# Patient Record
Sex: FEMALE | Race: WHITE | NOT HISPANIC OR LATINO | Employment: FULL TIME | URBAN - METROPOLITAN AREA
[De-identification: names, ages, dates, MRNs, and addresses within clinical notes are randomized per-mention and may not be internally consistent; named-entity substitution may affect disease eponyms.]

---

## 2017-02-10 ENCOUNTER — ALLSCRIPTS OFFICE VISIT (OUTPATIENT)
Dept: OTHER | Facility: OTHER | Age: 37
End: 2017-02-10

## 2018-01-14 VITALS
WEIGHT: 182 LBS | DIASTOLIC BLOOD PRESSURE: 72 MMHG | SYSTOLIC BLOOD PRESSURE: 124 MMHG | HEIGHT: 65 IN | BODY MASS INDEX: 30.32 KG/M2

## 2018-02-22 DIAGNOSIS — Z30.40 ENCOUNTER FOR SURVEILLANCE OF CONTRACEPTIVES, UNSPECIFIED CONTRACEPTIVE: Primary | ICD-10-CM

## 2018-02-22 RX ORDER — NORGESTIMATE AND ETHINYL ESTRADIOL
KIT
Qty: 84 TABLET | Refills: 2 | Status: SHIPPED | OUTPATIENT
Start: 2018-02-22 | End: 2018-04-03 | Stop reason: ALTCHOICE

## 2018-04-03 ENCOUNTER — ANNUAL EXAM (OUTPATIENT)
Dept: OBGYN CLINIC | Facility: CLINIC | Age: 38
End: 2018-04-03
Payer: COMMERCIAL

## 2018-04-03 VITALS
SYSTOLIC BLOOD PRESSURE: 132 MMHG | DIASTOLIC BLOOD PRESSURE: 60 MMHG | BODY MASS INDEX: 31.02 KG/M2 | WEIGHT: 186.2 LBS | HEIGHT: 65 IN

## 2018-04-03 DIAGNOSIS — Z12.31 ENCOUNTER FOR SCREENING MAMMOGRAM FOR MALIGNANT NEOPLASM OF BREAST: ICD-10-CM

## 2018-04-03 DIAGNOSIS — Z01.419 ENCOUNTER FOR GYNECOLOGICAL EXAMINATION WITHOUT ABNORMAL FINDING: Primary | ICD-10-CM

## 2018-04-03 PROCEDURE — 99395 PREV VISIT EST AGE 18-39: CPT | Performed by: OBSTETRICS & GYNECOLOGY

## 2018-04-03 RX ORDER — VENLAFAXINE 37.5 MG/1
TABLET ORAL
COMMUNITY
Start: 2017-08-29

## 2018-04-03 RX ORDER — ALPRAZOLAM 0.25 MG/1
0.25 TABLET ORAL DAILY
Refills: 3 | COMMUNITY
Start: 2018-03-07 | End: 2020-08-10

## 2018-04-03 RX ORDER — NITROFURANTOIN 25; 75 MG/1; MG/1
CAPSULE ORAL
Refills: 0 | COMMUNITY
Start: 2017-12-28 | End: 2020-08-10

## 2018-04-03 NOTE — PROGRESS NOTES
Assessment/Plan:    No problem-specific Assessment & Plan notes found for this encounter  Diagnoses and all orders for this visit:    Encounter for gynecological examination without abnormal finding    Encounter for screening mammogram for malignant neoplasm of breast  -     Mammo screening bilateral w cad; Future    Other orders  -     ALPRAZolam (XANAX) 0 25 mg tablet; Take 0 25 mg by mouth daily  -     MULTIPLE VITAMINS-CALCIUM PO; Take by mouth  -     nitrofurantoin (MACROBID) 100 mg capsule; TAKE 1 CAPSULE (100 MG TOTAL) BY MOUTH 2 (TWO) TIMES A DAY FOR 5 DAYS  -     venlafaxine (EFFEXOR) 37 5 mg tablet; TAKE ONE TABLET BY MOUTH DAILY        Annual examination was completed  Mammogram was ordered  Examination failed to reveal any urethral or bladder related issues  Patient will continue to monitor and call if there is any significant change otherwise to return to the office in 1 year  Subjective:      Patient ID: Sharee Trevino is a 43 y o  female  Patient returns for annual gyn visit  She has no new medical surgical issues  She did have her urinary irritation evaluated by Urogynecology in this past year and there was no significant functional or anatomic etiology delineated  She still has occasional bothersome irritation but nothing that is persistent  Menses have been normal in timing in flow   did complete follow-up for vasectomy  Gynecologic Exam         The following portions of the patient's history were reviewed and updated as appropriate:   She  has a past medical history of Anxiety and Blood type, Rh negative  She   Patient Active Problem List    Diagnosis Date Noted    Encounter for screening mammogram for malignant neoplasm of breast 2018    Encounter for gynecological examination without abnormal finding 2018     She  has a past surgical history that includes  section; Dilation and curettage, diagnostic / therapeutic;  Tooth extraction; and Tonsillectomy  Her family history includes Breast cancer in her paternal grandmother  She  reports that she has quit smoking  She has never used smokeless tobacco  She reports that she drinks alcohol  Her drug history is not on file  Current Outpatient Prescriptions   Medication Sig Dispense Refill    venlafaxine (EFFEXOR) 37 5 mg tablet TAKE ONE TABLET BY MOUTH DAILY      ALPRAZolam (XANAX) 0 25 mg tablet Take 0 25 mg by mouth daily  3    MULTIPLE VITAMINS-CALCIUM PO Take by mouth      nitrofurantoin (MACROBID) 100 mg capsule TAKE 1 CAPSULE (100 MG TOTAL) BY MOUTH 2 (TWO) TIMES A DAY FOR 5 DAYS   0     No current facility-administered medications for this visit  Current Outpatient Prescriptions on File Prior to Visit   Medication Sig    [DISCONTINUED] PREVIFEM 0 25-35 MG-MCG per tablet TAKE AS DIRECTED     No current facility-administered medications on file prior to visit  She has No Known Allergies       Review of Systems   All other systems reviewed and are negative  Objective:      /60 (BP Location: Left arm, Patient Position: Sitting, Cuff Size: Standard)   Ht 5' 5" (1 651 m)   Wt 84 5 kg (186 lb 3 2 oz)   LMP 03/03/2018   BMI 30 99 kg/m²          Physical Exam   Constitutional: She is oriented to person, place, and time  She appears well-developed and well-nourished  HENT:   Head: Normocephalic and atraumatic  Eyes: EOM are normal  Pupils are equal, round, and reactive to light  Neck: Normal range of motion  Neck supple  Cardiovascular: Normal rate and regular rhythm  Pulmonary/Chest: Effort normal and breath sounds normal    Abdominal: Soft  Bowel sounds are normal  She exhibits no mass  There is no tenderness  Gravid, nontender   Genitourinary:   Genitourinary Comments: Ext gen nl female w/o lesions  Vagina healthy w/o lesions or discharge  Cervix closed, no lesions or d/c  Uterus  Gravid, NT  Adnexa NT, no mass   Musculoskeletal: Normal range of motion  Neurological: She is alert and oriented to person, place, and time  She has normal reflexes  Skin: Skin is warm and dry  Psychiatric: She has a normal mood and affect   Her behavior is normal  Thought content normal

## 2019-07-16 ENCOUNTER — ANNUAL EXAM (OUTPATIENT)
Dept: OBGYN CLINIC | Facility: CLINIC | Age: 39
End: 2019-07-16
Payer: COMMERCIAL

## 2019-07-16 VITALS
WEIGHT: 198.8 LBS | DIASTOLIC BLOOD PRESSURE: 68 MMHG | SYSTOLIC BLOOD PRESSURE: 112 MMHG | BODY MASS INDEX: 33.94 KG/M2 | HEIGHT: 64 IN

## 2019-07-16 DIAGNOSIS — Z11.51 SCREENING FOR HUMAN PAPILLOMAVIRUS (HPV): ICD-10-CM

## 2019-07-16 DIAGNOSIS — Z12.4 CERVICAL CANCER SCREENING: ICD-10-CM

## 2019-07-16 DIAGNOSIS — Z12.39 SCREENING FOR MALIGNANT NEOPLASM OF BREAST: ICD-10-CM

## 2019-07-16 DIAGNOSIS — Z01.419 ENCOUNTER FOR GYNECOLOGICAL EXAMINATION (GENERAL) (ROUTINE) WITHOUT ABNORMAL FINDINGS: Primary | ICD-10-CM

## 2019-07-16 PROCEDURE — 99396 PREV VISIT EST AGE 40-64: CPT | Performed by: OBSTETRICS & GYNECOLOGY

## 2019-07-16 NOTE — PROGRESS NOTES
Assessment/Plan:    No problem-specific Assessment & Plan notes found for this encounter  Diagnoses and all orders for this visit:    Cervical cancer screening  -     PapIG, HPV, rfx 16/18    Encounter for gynecological examination (general) (routine) without abnormal findings  -     PapIG, HPV, rfx 16/18    Screening for malignant neoplasm of breast  -     Mammo screening bilateral w cad; Future    Screening for human papillomavirus (HPV)  -     PapIG, HPV, rfx 16/18        Annual examination was completed  Pap with HPV Co testing was obtained  Mammogram was ordered  Patient to return to the office in 1 year or as necessary  Subjective:      Patient ID: An Jackman is a 35 y o  female  Patient returns for annual gyn visit  She has no new medical surgical issues since last being seen  Menses have been well timed and normal in flow  Patient has some minimal physical activity  The following portions of the patient's history were reviewed and updated as appropriate:   She  has a past medical history of Anxiety and Blood type, Rh negative  She   Patient Active Problem List    Diagnosis Date Noted    Encounter for gynecological examination (general) (routine) without abnormal findings 2019    Screening for malignant neoplasm of breast 2019    Cervical cancer screening 2019    Screening for human papillomavirus (HPV) 2019    Encounter for screening mammogram for malignant neoplasm of breast 2018    Encounter for gynecological examination without abnormal finding 2018     She  has a past surgical history that includes  section; Dilation and curettage, diagnostic / therapeutic; Tooth extraction; and Tonsillectomy  Her family history includes Breast cancer in her paternal grandmother  She  reports that she has quit smoking  She has never used smokeless tobacco  She reports that she drinks alcohol   Her drug history is not on file   Current Outpatient Medications   Medication Sig Dispense Refill    ALPRAZolam (XANAX) 0 25 mg tablet Take 0 25 mg by mouth daily  3    MULTIPLE VITAMINS-CALCIUM PO Take by mouth      venlafaxine (EFFEXOR) 37 5 mg tablet TAKE ONE TABLET BY MOUTH DAILY      nitrofurantoin (MACROBID) 100 mg capsule TAKE 1 CAPSULE (100 MG TOTAL) BY MOUTH 2 (TWO) TIMES A DAY FOR 5 DAYS   0     No current facility-administered medications for this visit  Current Outpatient Medications on File Prior to Visit   Medication Sig    ALPRAZolam (XANAX) 0 25 mg tablet Take 0 25 mg by mouth daily    MULTIPLE VITAMINS-CALCIUM PO Take by mouth    venlafaxine (EFFEXOR) 37 5 mg tablet TAKE ONE TABLET BY MOUTH DAILY    nitrofurantoin (MACROBID) 100 mg capsule TAKE 1 CAPSULE (100 MG TOTAL) BY MOUTH 2 (TWO) TIMES A DAY FOR 5 DAYS  No current facility-administered medications on file prior to visit  She has No Known Allergies       Review of Systems   All other systems reviewed and are negative  Objective:      /68 (BP Location: Left arm, Patient Position: Sitting, Cuff Size: Large)   Ht 5' 4" (1 626 m)   Wt 90 2 kg (198 lb 12 8 oz)   LMP 06/22/2019   BMI 34 12 kg/m²          Physical Exam   Constitutional: She is oriented to person, place, and time  She appears well-developed and well-nourished  HENT:   Head: Normocephalic and atraumatic  Nose: Nose normal    Eyes: Pupils are equal, round, and reactive to light  EOM are normal    Neck: Normal range of motion  Neck supple  No thyromegaly present  Pulmonary/Chest: Effort normal  No respiratory distress  No breast tenderness, discharge or bleeding  Breasts no masses, tenderness, skin changes   Abdominal: Soft  She exhibits no distension and no mass  There is no tenderness  Hernia confirmed negative in the right inguinal area and confirmed negative in the left inguinal area     Genitourinary: Vagina normal and uterus normal  No breast tenderness, discharge or bleeding  Pelvic exam was performed with patient supine  No labial fusion  There is no rash, tenderness, lesion or injury on the right labia  There is no rash, tenderness, lesion or injury on the left labia  Cervix exhibits no motion tenderness, no discharge and no friability  Genitourinary Comments: Ext genitalia nl female w/o lesions  Vag healthy without lesions or discharge  Cervix healthy w/o lesions or discharge  uterus nl size, NT, no mass  Adnexa NT, no mass   Musculoskeletal: Normal range of motion  She exhibits no edema  Lymphadenopathy:        Right: No inguinal adenopathy present  Left: No inguinal adenopathy present  Neurological: She is alert and oriented to person, place, and time  She has normal reflexes  Skin: Skin is warm and dry  No rash noted  Psychiatric: She has a normal mood and affect  Her behavior is normal  Thought content normal    Nursing note and vitals reviewed

## 2019-07-18 LAB
CYTOLOGIST CVX/VAG CYTO: NORMAL
DX ICD CODE: NORMAL
HPV I/H RISK 1 DNA CVX QL PROBE+SIG AMP: NEGATIVE
OTHER STN SPEC: NORMAL
PATH REPORT.FINAL DX SPEC: NORMAL
SL AMB NOTE:: NORMAL
SL AMB SPECIMEN ADEQUACY: NORMAL
SL AMB TEST METHODOLOGY: NORMAL

## 2019-07-30 ENCOUNTER — TELEPHONE (OUTPATIENT)
Dept: OBGYN CLINIC | Facility: CLINIC | Age: 39
End: 2019-07-30

## 2020-08-10 ENCOUNTER — ANNUAL EXAM (OUTPATIENT)
Dept: OBGYN CLINIC | Facility: CLINIC | Age: 40
End: 2020-08-10
Payer: COMMERCIAL

## 2020-08-10 VITALS
SYSTOLIC BLOOD PRESSURE: 112 MMHG | BODY MASS INDEX: 33.89 KG/M2 | WEIGHT: 203.4 LBS | TEMPERATURE: 97.7 F | HEIGHT: 65 IN | DIASTOLIC BLOOD PRESSURE: 68 MMHG

## 2020-08-10 DIAGNOSIS — Z01.419 ENCOUNTER FOR GYNECOLOGICAL EXAMINATION (GENERAL) (ROUTINE) WITHOUT ABNORMAL FINDINGS: Primary | ICD-10-CM

## 2020-08-10 DIAGNOSIS — Z12.31 ENCOUNTER FOR SCREENING MAMMOGRAM FOR MALIGNANT NEOPLASM OF BREAST: ICD-10-CM

## 2020-08-10 PROBLEM — Z11.51 SCREENING FOR HUMAN PAPILLOMAVIRUS (HPV): Status: RESOLVED | Noted: 2019-07-16 | Resolved: 2020-08-10

## 2020-08-10 PROBLEM — Z12.4 CERVICAL CANCER SCREENING: Status: RESOLVED | Noted: 2019-07-16 | Resolved: 2020-08-10

## 2020-08-10 PROBLEM — Z12.39 SCREENING FOR MALIGNANT NEOPLASM OF BREAST: Status: RESOLVED | Noted: 2019-07-16 | Resolved: 2020-08-10

## 2020-08-10 PROCEDURE — 99396 PREV VISIT EST AGE 40-64: CPT | Performed by: OBSTETRICS & GYNECOLOGY

## 2020-08-10 RX ORDER — BUSPIRONE HYDROCHLORIDE 5 MG/1
TABLET ORAL
COMMUNITY
Start: 2020-04-03

## 2020-08-10 NOTE — PROGRESS NOTES
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  Last Pap 7/16/19 - Normal Cytology, Neg HPV  Last Mammo - overdue, [facility]    We reviewed ASCCP guidelines for Pap testing today       Family hx of breast cancer:  P  Grandmother  Family hx of ovarian cancer:  No  Family hx of colon cancer:  No      Current Outpatient Medications:     MULTIPLE VITAMINS-CALCIUM PO, Take by mouth, Disp: , Rfl:     venlafaxine (EFFEXOR) 37 5 mg tablet, TAKE ONE TABLET BY MOUTH DAILY, Disp: , Rfl:   Social History     Socioeconomic History    Marital status: /Civil Union     Spouse name: Not on file    Number of children: Not on file    Years of education: Not on file    Highest education level: Not on file   Occupational History    Not on file   Social Needs    Financial resource strain: Not on file    Food insecurity     Worry: Not on file     Inability: Not on file   [facility] Medical needs     Medical: Not on file     Non-medical: Not on file   Tobacco Use    Smoking status: Former Smoker    Smokeless tobacco: Never Used   Substance and Sexual Activity    Alcohol use: Yes     Comment: social    Drug use: Never    Sexual activity: Yes     Partners: Male     Birth control/protection: Male Sterilization     Comment: vasc   Lifestyle    Physical activity     Days per week: Not on file     Minutes per session: Not on file    Stress: Not on file   Relationships    Social connections     Talks on phone: Not on file     Gets together: Not on file     Attends [employer] service: Not on file     Active member of club or organization: Not on file     Attends meetings of clubs or organizations: Not on file     Relationship status: Not on file    Intimate partner violence     Fear of current or ex partner: Not on file     Emotionally abused: Not on file     Physically abused: Not on file     Forced sexual activity: Not on file   Other Topics Concern    Not on file   Social History Narrative    Daily Coffee: 1 cup/day     Family History   Problem Relation Age of Onset    Breast cancer Paternal Grandmother       Past Medical History:   Diagnosis Date    Anxiety     Blood type, Rh negative         Review of Systems   Respiratory: Negative  Cardiovascular: Negative  Gastrointestinal: Negative for constipation and diarrhea  Genitourinary: Negative for difficulty urinating, pelvic pain, vaginal bleeding, vaginal discharge, itching or odor  O:  Blood pressure 112/68, temperature 97 7 °F (36 5 °C), height 5' 4 5" (1 638 m), weight 92 3 kg (203 lb 6 4 oz)  Patient appears well and is not in distress  Breasts are symmetrical without mass, tenderness, nipple discharge, skin changes or adenopathy  Abdomen is soft and nontender without masses  External genitals are normal without lesions or rashes  Urethral meatus and urethra are normal  Bladder is normal to palpation  Vagina is normal without discharge or bleeding  Cervix is normal without discharge or lesion  Uterus is normal, mobile, nontender without palpable mass  Adnexa are normal, nontender, without palpable mass  A:  Yearly exam      P:   Pap up to date   Mammo slip provided    RTO one year for yearly exam or sooner as needed

## 2020-09-21 DIAGNOSIS — Z12.31 ENCOUNTER FOR SCREENING MAMMOGRAM FOR MALIGNANT NEOPLASM OF BREAST: ICD-10-CM

## 2021-03-26 DIAGNOSIS — Z23 ENCOUNTER FOR IMMUNIZATION: ICD-10-CM

## 2021-03-29 ENCOUNTER — TELEPHONE (OUTPATIENT)
Dept: OBGYN CLINIC | Facility: CLINIC | Age: 41
End: 2021-03-29

## 2021-03-29 NOTE — TELEPHONE ENCOUNTER
Shaving her vaginal area and cut herself, she was asking what to do, I advised to cb if still bleeding

## 2021-08-16 ENCOUNTER — ANNUAL EXAM (OUTPATIENT)
Dept: OBGYN CLINIC | Facility: CLINIC | Age: 41
End: 2021-08-16
Payer: COMMERCIAL

## 2021-08-16 VITALS
BODY MASS INDEX: 35.96 KG/M2 | SYSTOLIC BLOOD PRESSURE: 120 MMHG | WEIGHT: 210.6 LBS | DIASTOLIC BLOOD PRESSURE: 70 MMHG | HEIGHT: 64 IN

## 2021-08-16 DIAGNOSIS — Z01.419 ENCOUNTER FOR GYNECOLOGICAL EXAMINATION (GENERAL) (ROUTINE) WITHOUT ABNORMAL FINDINGS: Primary | ICD-10-CM

## 2021-08-16 DIAGNOSIS — Z12.31 ENCOUNTER FOR SCREENING MAMMOGRAM FOR MALIGNANT NEOPLASM OF BREAST: ICD-10-CM

## 2021-08-16 PROBLEM — M62.89 PELVIC FLOOR DYSFUNCTION: Status: ACTIVE | Noted: 2017-11-16

## 2021-08-16 PROBLEM — E66.9 CLASS 2 OBESITY IN ADULT: Status: ACTIVE | Noted: 2021-08-16

## 2021-08-16 PROBLEM — F41.9 ANXIETY: Status: ACTIVE | Noted: 2021-08-16

## 2021-08-16 PROBLEM — E66.812 CLASS 2 OBESITY IN ADULT: Status: ACTIVE | Noted: 2021-08-16

## 2021-08-16 PROCEDURE — 99396 PREV VISIT EST AGE 40-64: CPT | Performed by: OBSTETRICS & GYNECOLOGY

## 2021-08-16 NOTE — PROGRESS NOTES
Rodney Rocha  1980      CC:  Yearly exam    S:  42 y o  female here for yearly exam  Her cycles are regular, not heavy or crampy  LMP 7/22/21  Sexual activity: She is sexually active without pain, bleeding or dryness   & monogamous  Kids are now 6 and 15  Work has been good  Contraception: She uses vasectomy  for contraception  Gardasil:  She has not had the Gardasil series  VIS given, she is aware she can start at any time, eligible until age 39  Last Pap 7/2019 - Normal Cytology, Neg HPV  Last Mammo 9/2020 - [facility]    We reviewed ASCCP guidelines for Pap testing today  Family hx of breast cancer: PGM  Family hx of ovarian cancer: no  Family hx of colon cancer: no      Current Outpatient Medications:     busPIRone (BUSPAR) 5 mg tablet, , Disp: , Rfl:     MULTIPLE VITAMINS-CALCIUM PO, Take by mouth, Disp: , Rfl:     venlafaxine (EFFEXOR) 37 5 mg tablet, TAKE ONE TABLET BY MOUTH DAILY, Disp: , Rfl:     Family History   Problem Relation Age of Onset    Breast cancer Paternal Grandmother       Past Medical History:   Diagnosis Date    Anxiety     Blood type, Rh negative         Review of Systems   Respiratory: Negative  Cardiovascular: Negative  Gastrointestinal: Negative for constipation and diarrhea  Genitourinary: Negative for difficulty urinating, pelvic pain, vaginal bleeding, vaginal discharge, itching or odor  O:  Blood pressure 120/70, height 5' 4" (1 626 m), weight 95 5 kg (210 lb 9 6 oz), last menstrual period 07/22/2021  Patient appears well and is not in distress  Breasts are symmetrical without mass, tenderness, nipple discharge, skin changes or adenopathy  Abdomen is soft and nontender without masses  External genitals are normal without lesions or rashes  Urethral meatus and urethra are normal  Bladder is normal to palpation  Vagina is normal without discharge or bleeding  Cervix is normal without discharge or lesion     Uterus is normal, mobile, nontender without palpable mass  Adnexa are normal, nontender, without palpable mass  A:  Yearly exam      P:   Pap up to date   Mammo slip provided    VIS on HPV vaccine given  RTO one year for yearly exam or sooner as needed

## 2022-08-30 ENCOUNTER — ANNUAL EXAM (OUTPATIENT)
Dept: OBGYN CLINIC | Facility: CLINIC | Age: 42
End: 2022-08-30
Payer: COMMERCIAL

## 2022-08-30 VITALS
DIASTOLIC BLOOD PRESSURE: 64 MMHG | WEIGHT: 205.4 LBS | HEIGHT: 64 IN | SYSTOLIC BLOOD PRESSURE: 100 MMHG | BODY MASS INDEX: 35.07 KG/M2

## 2022-08-30 DIAGNOSIS — Z12.31 ENCOUNTER FOR SCREENING MAMMOGRAM FOR MALIGNANT NEOPLASM OF BREAST: ICD-10-CM

## 2022-08-30 DIAGNOSIS — Z01.419 ENCNTR FOR GYN EXAM (GENERAL) (ROUTINE) W/O ABN FINDINGS: Primary | ICD-10-CM

## 2022-08-30 PROCEDURE — 99396 PREV VISIT EST AGE 40-64: CPT | Performed by: OBSTETRICS & GYNECOLOGY

## 2022-08-30 NOTE — PROGRESS NOTES
Naun Rocha  1980      CC:  Yearly exam    S:  36 y o  female here for yearly exam  Her cycles are regular, not heavy or crampy  Daughter is 12 and will be coming to see me soon  She denies vaginal discharge, itching, pelvic pain  She has no urinary concerns other than some increase in frequency, does not have incontinence  No bowel concerns  No breast concerns  Sexual activity: She is sexually active without pain, bleeding or dryness  She is  and monogamous  She is not interested in STD screening today  Contraception: She uses vasectomy for contraception  Last Pap: 7/16/2019 - NILM, Neg HPV  Last Mammo: 9/24/21 - [facility]    We reviewed ASCCP guidelines for Pap testing today  Family hx of breast cancer: PGM  Family hx of ovarian cancer: no  Family hx of colon cancer: no      Current Outpatient Medications:     busPIRone (BUSPAR) 5 mg tablet, , Disp: , Rfl:     MULTIPLE VITAMINS-CALCIUM PO, Take by mouth, Disp: , Rfl:     venlafaxine (EFFEXOR) 37 5 mg tablet, TAKE ONE TABLET BY MOUTH DAILY, Disp: , Rfl:      Patient Active Problem List   Diagnosis    Encounter for screening mammogram for malignant neoplasm of breast    Encounter for gynecological examination (general) (routine) without abnormal findings    Pelvic floor dysfunction    Anxiety    Class 2 obesity in adult     Past Medical History:   Diagnosis Date    Anxiety     Blood type, Rh negative      Family History   Problem Relation Age of Onset    Breast cancer Paternal Grandmother           Review of Systems   Respiratory: Negative  Cardiovascular: Negative  Gastrointestinal: Negative for constipation and diarrhea  O:  Blood pressure 100/64, height 5' 4 17" (1 63 m), weight 93 2 kg (205 lb 6 4 oz), last menstrual period 08/14/2022  Patient appears well and is not in distress  Breasts are symmetrical without mass, tenderness, nipple discharge, skin changes or adenopathy     Abdomen is soft and nontender without masses  External genitals are normal without lesions or rashes  Urethral meatus and urethra are normal  Bladder is normal to palpation  Vagina is normal without discharge or bleeding  Cervix is normal without discharge or lesion  Uterus is normal, mobile, nontender without palpable mass  Adnexa are normal, nontender, without palpable mass  A:  Yearly exam      P:   Pap & HPV up to date   Mammo ordered   Colon Cancer Screening age 38   RTO one year for yearly exam or sooner as needed

## 2022-10-10 DIAGNOSIS — Z12.31 ENCOUNTER FOR SCREENING MAMMOGRAM FOR MALIGNANT NEOPLASM OF BREAST: ICD-10-CM

## 2023-11-17 ENCOUNTER — ANNUAL EXAM (OUTPATIENT)
Age: 43
End: 2023-11-17
Payer: COMMERCIAL

## 2023-11-17 VITALS
SYSTOLIC BLOOD PRESSURE: 118 MMHG | DIASTOLIC BLOOD PRESSURE: 60 MMHG | BODY MASS INDEX: 35.12 KG/M2 | WEIGHT: 210.8 LBS | HEIGHT: 65 IN

## 2023-11-17 DIAGNOSIS — Z12.31 ENCOUNTER FOR SCREENING MAMMOGRAM FOR MALIGNANT NEOPLASM OF BREAST: ICD-10-CM

## 2023-11-17 DIAGNOSIS — Z11.51 SCREENING FOR HUMAN PAPILLOMAVIRUS (HPV): ICD-10-CM

## 2023-11-17 DIAGNOSIS — Z01.419 ENCNTR FOR GYN EXAM (GENERAL) (ROUTINE) W/O ABN FINDINGS: Primary | ICD-10-CM

## 2023-11-17 PROCEDURE — G0476 HPV COMBO ASSAY CA SCREEN: HCPCS | Performed by: OBSTETRICS & GYNECOLOGY

## 2023-11-17 PROCEDURE — G0145 SCR C/V CYTO,THINLAYER,RESCR: HCPCS | Performed by: OBSTETRICS & GYNECOLOGY

## 2023-11-17 PROCEDURE — S0612 ANNUAL GYNECOLOGICAL EXAMINA: HCPCS | Performed by: OBSTETRICS & GYNECOLOGY

## 2023-11-17 NOTE — PROGRESS NOTES
Lyric Rocha  1980      CC:  Yearly exam    S:  39 y.o. female here for yearly exam. Her cycles are regular, not heavy or crampy. Seeing PT/urogyn at [facility] for urinary hesitancy. She denies vaginal discharge, itching, pelvic pain. She has urinary concerns - continuing to work on this. No bowel concerns. No breast concerns. Sexual activity: She is sexually active without pain, bleeding or dryness. She is not interested in STD screening today. Contraception: She uses vasectomy for contraception. Last Pap: 7/16/19 - NILM, Neg HPV  Last Mammo:  11/3/2023 - [facility]    We reviewed [facility] guidelines for Pap testing today. Family hx of breast cancer: PGM  Family hx of ovarian cancer: no  Family hx of colon cancer: no      Current Outpatient Medications:     busPIRone (BUSPAR) 5 mg tablet, , Disp: , Rfl:     MULTIPLE VITAMINS-CALCIUM PO, Take by mouth, Disp: , Rfl:     venlafaxine (EFFEXOR) 37.5 mg tablet, TAKE ONE TABLET BY MOUTH DAILY, Disp: , Rfl:   Patient Active Problem List   Diagnosis    Encounter for screening mammogram for malignant neoplasm of breast    Encounter for gynecological examination (general) (routine) without abnormal findings    Pelvic floor dysfunction    Anxiety    Class 2 obesity in adult     Past Medical History:   Diagnosis Date    Anxiety     Blood type, Rh negative      Family History   Problem Relation Age of Onset    Breast cancer Paternal Grandmother       Review of Systems   Respiratory: Negative. Cardiovascular: Negative. Gastrointestinal: Negative for constipation and diarrhea. O:  Blood pressure 118/60, height 5' 4.5" (1.638 m), weight 95.6 kg (210 lb 12.8 oz), last menstrual period 11/10/2023. Patient appears well and is not in distress  Breasts are symmetrical without mass, tenderness, nipple discharge, skin changes or adenopathy. Abdomen is soft and nontender without masses.    External genitals are normal without lesions or rashes. Urethral meatus and urethra are normal  Bladder is normal to palpation  Vagina is normal without discharge or bleeding. Cervix is normal without discharge or lesion. Uterus is normal, mobile, nontender without palpable mass. Adnexa are normal, nontender, without palpable mass. A:  Yearly exam.     P:   Pap & HPV today   Mammo ordered, up to date   Colon Cancer Screening age 38   RTO one year for yearly exam or sooner as needed.

## 2023-11-20 LAB
HPV HR 12 DNA CVX QL NAA+PROBE: NEGATIVE
HPV16 DNA CVX QL NAA+PROBE: NEGATIVE
HPV18 DNA CVX QL NAA+PROBE: NEGATIVE

## 2023-11-28 LAB
LAB AP GYN PRIMARY INTERPRETATION: NORMAL
LAB AP LMP: NORMAL
Lab: NORMAL

## 2024-02-21 PROBLEM — Z01.419 ENCOUNTER FOR GYNECOLOGICAL EXAMINATION (GENERAL) (ROUTINE) WITHOUT ABNORMAL FINDINGS: Status: RESOLVED | Noted: 2019-07-16 | Resolved: 2024-02-21

## 2024-11-20 ENCOUNTER — TELEPHONE (OUTPATIENT)
Age: 44
End: 2024-11-20

## 2025-01-24 ENCOUNTER — EVALUATION (OUTPATIENT)
Dept: PHYSICAL THERAPY | Facility: CLINIC | Age: 45
End: 2025-01-24
Payer: COMMERCIAL

## 2025-01-24 DIAGNOSIS — M62.89 PELVIC FLOOR DYSFUNCTION: Primary | ICD-10-CM

## 2025-01-24 PROCEDURE — 97530 THERAPEUTIC ACTIVITIES: CPT | Performed by: PHYSICAL THERAPIST

## 2025-01-24 PROCEDURE — 97162 PT EVAL MOD COMPLEX 30 MIN: CPT | Performed by: PHYSICAL THERAPIST

## 2025-01-24 NOTE — PROGRESS NOTES
PT Evaluation     Today's date: 2025  Patient name: Nga Rocha  : 1980  MRN: [id number]  Referring provider: Vinicius Franco PT  Dx:   Encounter Diagnosis     ICD-10-CM    1. Pelvic floor dysfunction  M62.89                      Assessment  Impairments: abnormal muscle tone, activity intolerance, lacks appropriate home exercise program, pain with function, poor posture , poor body mechanics, activity limitations and endurance  Symptom irritability: moderate  Understanding of Dx/Px/POC: excellent     Prognosis: excellent    Goals  (Up to 8 weeks)  1. Independent and safe with all PF HEP.  2. Independent and safe with self-postural correction and home use of a squatty potty to improve BM.  3. Independent and safe with self stretching tech to improve b/l AD's flexibility.  4. Independent and safe with pelvic wand and/or dilators use to improve comfort with intimacy.    Plan  Patient would benefit from: skilled physical therapy  Planned modality interventions: biofeedback    Planned therapy interventions: manual therapy, neuromuscular re-education, patient/caregiver education, postural training, stretching, therapeutic activities, therapeutic exercise, home exercise program, graded exercise, graded activity, flexibility, body mechanics training, breathing training, behavior modification, activity modification and abdominal trunk stabilization    Frequency: 1x week  Duration in weeks: 8  Treatment plan discussed with: patient        PT Pelvic Floor Subjective:   History of Present Illness:   Pt is 44 y/o female with Hx of increase urgency to urinate, possible incomplete bladder emptying with need to strain, strain with BM, discomfort with intimacy. Pt was seen by her urogynecologist (from [facility]) who recommended PF PT due to PFM tightness. Symptoms gradually getting worse. Onset: after , around .  Current functional limitations: (+) PFM tightness, (+) pain with intimacy: on insertion and deep  penetration, urine flow is very slow, (+) on/off constipation.        Recurrent probem    Quality of life: poor    Social Support:     Lives in:  Multiple-level home    Lives with:  Spouse and young children    Relationship status: /committed    Work status: employed full time (prolong sitting)    Life stress level: 5    Life stress severity: moderate    History of Depression: noPronouns: she/her  Hand dominance:  Right  Diet and Exercise:    Diet:balanced nutrition    Exercise type: no activity    Exercise frequency: never    Not exercising due to: not interested  OB/ gyn History    Gestational History:     Prior Pregnancy: Yes      Number of prior pregnancies: 4    Number of term pregnancies: 2    Delivery Type: vaginal delivery and  section      Number of vaginal deliveries: 1 (, 7.8 lbs)    Number of caesarian sections: 1 (, 7.8 lb)    Delivery Complications:  First delivery: vacuum assist, prolong delivery time    Second delivery: emergency     Menstrual History:    Date of last menstrual period: 2024    Menstrual irregularities regular menses    Painful periods:  No difficulty managing menstrual pain    Tolerates tampons: no by choice and no  Bladder Function:     Voiding Difficulties positive for: urgency, straining and incomplete emptying       Voiding Difficulties comments:     Voiding frequency: every 1-2 hours    Urinary leakage: no urine leakage    Nocturia (episodes per night): 0    Painful urination: No      Fluid Intake Type:  Water, coffee and alcohol    Intake (ounces): Water intake (oz): 16 oz x 4. Coffee intake (oz): 16 oz per day. Alcohol intake (oz): socially on/off.  Incontinence Management:     Pads/Diaper Use:  None  Bowel Function:     Voiding DIfficulties: unfinished feeling after defecating and constipation      Bowel Function comments:  Educated on postural correction with a squatty potty use and BM improvement.    Bowel frequency: daily and multiple  "times a day    [place] Stool Scale: type 4, type 5 and type 1    Stool softener use: no stool softeners    Enema use: no enema    Uses \"squatty potty\": no Squatty Potty  Sexual Function:     Sexually Active:  Sexually active    pain does not cause abstinenceSexual function: vaginal pain  Pain:     No pain reported by patient.  Diagnostic Tests:     Urodynamics: normal    Cystoscopy: normal  Treatments:     Previous treatment:  Physical therapy and medication  Patient Goals:     Patient goals for therapy:  Improved quality of life, fully empty bladder or bowels, improved bladder or bowel function, improved comfort, relaxation and decreased interpersonal problems      Objective     Postural Observations  Seated posture: fair  Standing posture: fair  Correction of posture: has no consistent effect      Active Range of Motion     Lumbar   Normal active range of motion    Passive Range of Motion     Lumbar   Normal passive range of motion    Strength/Myotome Testing     Lumbar   Left   Normal strength    Right   Normal strength      Abdominal Assessment:      Position: supine exam  Abdominal Assessment:  Abdominal findings with curl up: supine with knees flexed.    Diastatis   Diastasis recti present? no  Connective tissue integrity at linea alba: firm  no tenderness at linea alba  able to engage transverse abdominis     Skin inspection:   scars present.   Additional skin inspection details: Old  scar: fully healed, no restrictions, no hypersensitivity.      General Perineum Exam:   perineum intact.     Visual Inspection of Perineum:   Excursion of perineal body in cephalad direction with contraction of pelvic floor muscles (PFM): good  Excursion of perineal body in caudal direction with relaxation of pelvic floor muscles (PFM): good   Involuntary contraction with coughing: no  Involuntary relaxation with bearing down: no  [place] swab test: non-tender  Cough reflex: cough reflex  Sphincter Tone Resting: " normal  Sphincter Tone Squeeze: normal  Sensation: intact  Tenderness: unprovoked    Pelvic Organ Prolapse   Position: hook-lying  At rest: none  With bearing down: mild (>1cm from hymenal remnants)  Location: midline vaginal  Perineal body inspection: within normal limits        Pelvic Floor Muscle Exam:     Muscle Contraction: well isolated   Breathing pattern with contraction: holding breath   Pelvic floor muscle relaxation is complete.         PERFECT Score   Power right: 4+/5   Power left: 4+/5   Endurance (seconds to max): 5   Repetitions (before fatigue): 5   Fast flicks (in 10 seconds): 7      Rectal Pelvic Floor Muscle Exam    External anal sphincter: wink reflex intact    pelvic floor exam consent given by patient    Pelvic exam completed: vaginally                Precautions: not nay given      Manuals 1/24            PF MMT 4+/5 with increase tone, poor full relaxation of PFM            B/l AD's stretch             Internal/external vaginal stretching             Abdominal myofascial tech             PM edu/benefits             Neuro Re-Ed                                       T'dmill/ posture                          Rec bike/posture                                       Ther Ex             Supine abdominal breathing demo            Butterfly stretch demo            Child pose stretch demo                                                                             Ther Activity             HEP edu/review 5' given            Squatty potty edu/postural edu 2.5 min            Pelvic wand use 2.5 min                         Gait Training                                       Modalities

## 2025-02-14 ENCOUNTER — APPOINTMENT (OUTPATIENT)
Dept: PHYSICAL THERAPY | Facility: CLINIC | Age: 45
End: 2025-02-14
Payer: COMMERCIAL

## 2025-02-20 ENCOUNTER — OFFICE VISIT (OUTPATIENT)
Dept: PHYSICAL THERAPY | Facility: CLINIC | Age: 45
End: 2025-02-20
Payer: COMMERCIAL

## 2025-02-20 DIAGNOSIS — M62.89 PELVIC FLOOR DYSFUNCTION: Primary | ICD-10-CM

## 2025-02-20 PROCEDURE — 97530 THERAPEUTIC ACTIVITIES: CPT | Performed by: PHYSICAL THERAPIST

## 2025-02-20 PROCEDURE — 97140 MANUAL THERAPY 1/> REGIONS: CPT | Performed by: PHYSICAL THERAPIST

## 2025-02-20 PROCEDURE — 97110 THERAPEUTIC EXERCISES: CPT | Performed by: PHYSICAL THERAPIST

## 2025-02-20 NOTE — PROGRESS NOTES
"Daily Note     Today's date: 2025  Patient name: Nga Rocha  : 1980  MRN: [id number]  Referring provider: Vinicius Franco, PT  Dx:   Encounter Diagnosis     ICD-10-CM    1. Pelvic floor dysfunction  M62.89                      Subjective: No change in symptoms verbalized. (+) pelvic wand presented during tx.      Objective: See treatment diary below      Assessment: Tolerated treatment well. Patient demonstrated fatigue post treatment and would benefit from continued PT for further internal manual tx and use of a pelvic wand. Pt will focus on L LQ pelvic floor region with daily home use of a pelvic wand. (+) TP's in L obturator internus, [place] and Iliococcygeus mm noted/verbalized. Pt is very motivated and willing to participate in PT tx. No discomfort voiced upon leaving [facility] PT.      Plan: Continue per plan of care.  Progress treatment as tolerated.       Precautions: not nay given      Manuals            PF MMT 4+/5 with increase tone, poor full relaxation of PFM            B/l AD's stretch  SZ           Internal/external vaginal stretching  SZ           Abdominal myofascial tech  SZ           PM edu/benefits             Neuro Re-Ed                                       T'dmill/ posture                          Rec bike/posture                                       Ther Ex             Supine abdominal breathing demo reviewed           Butterfly stretch demo 3x30\"           Child pose stretch demo                                                                             Ther Activity             HEP edu/review 5' given 2.5'           Squatty potty edu/postural edu 2.5 min 2.5'           Pelvic wand use 2.5 min Direct observation/edu on use 10'                        Gait Training                                       Modalities                                            "

## 2025-02-28 ENCOUNTER — OFFICE VISIT (OUTPATIENT)
Dept: PHYSICAL THERAPY | Facility: CLINIC | Age: 45
End: 2025-02-28
Payer: COMMERCIAL

## 2025-02-28 DIAGNOSIS — M62.89 PELVIC FLOOR DYSFUNCTION: Primary | ICD-10-CM

## 2025-02-28 PROCEDURE — 97140 MANUAL THERAPY 1/> REGIONS: CPT | Performed by: PHYSICAL THERAPIST

## 2025-02-28 PROCEDURE — 97110 THERAPEUTIC EXERCISES: CPT | Performed by: PHYSICAL THERAPIST

## 2025-02-28 PROCEDURE — 97530 THERAPEUTIC ACTIVITIES: CPT | Performed by: PHYSICAL THERAPIST

## 2025-02-28 NOTE — PROGRESS NOTES
"Daily Note     Today's date: 2025  Patient name: Nga Rocha  : 1980  MRN: [id number]  Referring provider: Vinicius Franco, PT  Dx:   Encounter Diagnosis     ICD-10-CM    1. Pelvic floor dysfunction  M62.89                      Subjective: No new changes voiced. (+) use of a pelvic wand at home. (+) straining with voiding, repositioning in sitting while voiding needed, no pain at rest in PF      Objective: See treatment diary below      Assessment: Tolerated treatment well. Patient c/o increase discomfort with internal PF stretching. \"[place] like\" TP palpated in mid/left L PF region. Pt was advised to reach-out to her OBGYN to r/o any new changes in PF (poss US recommended). HEP was updated and reviewed. Overall good destiny to tx. No worsening of discomfort upon leaving [facility] PT verbalized.      Plan: Continue per plan of care.  Progress treatment as tolerated.       Precautions: not nay given      Manuals           PF MMT 4+/5 with increase tone, poor full relaxation of PFM  same          B/l AD's stretch  SZ           Internal/external vaginal stretching  SZ SZ          Abdominal myofascial tech  SZ           PM edu/benefits   SZ          Neuro Re-Ed                                       T'dmill/ posture                          Rec bike/posture                                       Ther Ex             Supine abdominal breathing demo reviewed review          Butterfly stretch demo 3x30\" review          Child pose stretch demo  review          Happy baby stretch w PF lengthening   5x5\" VC's/TC's          Supine PF lengthening   5x5\" VC's          Sitting PF elevator with lengthening   5x5\" VC's/TC's                                    Ther Activity             HEP edu/review 5' given 2.5' 8' update/given          Squatty potty edu/postural edu 2.5 min 2.5'           Pelvic wand use 2.5 min Direct observation/edu on use 10'                        Gait Training                                    "    Modalities

## 2025-03-05 ENCOUNTER — OFFICE VISIT (OUTPATIENT)
Age: 45
End: 2025-03-05
Payer: COMMERCIAL

## 2025-03-05 VITALS
SYSTOLIC BLOOD PRESSURE: 120 MMHG | WEIGHT: 210 LBS | HEIGHT: 65 IN | BODY MASS INDEX: 34.99 KG/M2 | DIASTOLIC BLOOD PRESSURE: 68 MMHG

## 2025-03-05 DIAGNOSIS — R39.89 URETHRAL PAIN: Primary | ICD-10-CM

## 2025-03-05 DIAGNOSIS — N36.9 URETHRAL LESION: ICD-10-CM

## 2025-03-05 PROCEDURE — 99213 OFFICE O/P EST LOW 20 MIN: CPT | Performed by: OBSTETRICS & GYNECOLOGY

## 2025-03-05 RX ORDER — METHENAMINE HIPPURATE 1000 MG/1
1 TABLET ORAL
COMMUNITY
Start: 2024-11-29 | End: 2025-11-29

## 2025-03-05 NOTE — PROGRESS NOTES
"Name: Nga Rocha      : 1980      MRN: [id number]  Encounter Provider: Vika Jimenez MD  Encounter Date: 3/5/2025   Encounter department: [facility] [place]  :  Assessment & Plan  Urethral pain    Orders:    MRI pelvis female wo and w contrast; Future    Urethral lesion    Orders:    MRI pelvis female wo and w contrast; Future        History of Present Illness   HPI  Nga Rocha is a 45 y.o. female who presents for eval after pelvic floor physical therapist felt a marble size lump once on exam, that she could not reproduce.   Nga has had longstanding bladder/urinary problems.     History obtained from: patient    Review of Systems       Objective   /68   Ht 5' 4.5\" (1.638 m)   Wt 95.3 kg (210 lb)   LMP 2025 (Exact Date)   BMI 35.49 kg/m²      Physical Exam  Vitals reviewed.   Constitutional:       Appearance: Normal appearance.   Genitourinary:     General: Normal vulva.       Neurological:      Mental Status: She is alert.   Psychiatric:         Mood and Affect: Mood normal.         Behavior: Behavior normal.           "

## 2025-03-13 ENCOUNTER — HOSPITAL ENCOUNTER (OUTPATIENT)
Dept: MRI IMAGING | Facility: HOSPITAL | Age: 45
End: 2025-03-13
Payer: COMMERCIAL

## 2025-03-13 DIAGNOSIS — R39.89 URETHRAL PAIN: ICD-10-CM

## 2025-03-13 DIAGNOSIS — N36.9 URETHRAL LESION: ICD-10-CM

## 2025-03-13 PROCEDURE — 72197 MRI PELVIS W/O & W/DYE: CPT

## 2025-03-13 PROCEDURE — A9585 GADOBUTROL INJECTION: HCPCS | Performed by: OBSTETRICS & GYNECOLOGY

## 2025-03-13 RX ORDER — GADOBUTROL 604.72 MG/ML
9 INJECTION INTRAVENOUS
Status: COMPLETED | OUTPATIENT
Start: 2025-03-13 | End: 2025-03-13

## 2025-03-13 RX ADMIN — GADOBUTROL 9 ML: 604.72 INJECTION INTRAVENOUS at 18:03

## 2025-03-26 ENCOUNTER — TELEPHONE (OUTPATIENT)
Age: 45
End: 2025-03-26

## 2025-03-26 NOTE — TELEPHONE ENCOUNTER
Pt called in to discuss MRI results with provider Dr. Partida. Request call back. Pt req call back at [phone]

## 2025-03-28 ENCOUNTER — RESULTS FOLLOW-UP (OUTPATIENT)
Dept: LABOR AND DELIVERY | Facility: HOSPITAL | Age: 45
End: 2025-03-28

## 2025-03-28 DIAGNOSIS — M62.89 PELVIC FLOOR DYSFUNCTION: Primary | ICD-10-CM

## 2025-03-28 NOTE — TELEPHONE ENCOUNTER
Patient called in again, upset that she has been trying to speak with Dr Partida regarding these results and has called/messaged multiple times and has still not heard back.     Apologized to patient, informed I will re route the message and include clinical team.